# Patient Record
Sex: FEMALE | Race: WHITE | NOT HISPANIC OR LATINO | URBAN - METROPOLITAN AREA
[De-identification: names, ages, dates, MRNs, and addresses within clinical notes are randomized per-mention and may not be internally consistent; named-entity substitution may affect disease eponyms.]

---

## 2018-06-26 ENCOUNTER — HISTORICAL (OUTPATIENT)
Dept: ADMINISTRATIVE | Facility: HOSPITAL | Age: 63
End: 2018-06-26

## 2020-06-16 ENCOUNTER — HISTORICAL (OUTPATIENT)
Dept: ADMINISTRATIVE | Facility: HOSPITAL | Age: 65
End: 2020-06-16

## 2020-08-19 ENCOUNTER — HISTORICAL (OUTPATIENT)
Dept: RADIOLOGY | Facility: HOSPITAL | Age: 65
End: 2020-08-19

## 2020-09-03 LAB
ABS NEUT (OLG): 5.26 X10(3)/MCL (ref 2.1–9.2)
ALBUMIN SERPL-MCNC: 4.2 GM/DL (ref 3.4–4.8)
ALBUMIN/GLOB SERPL: 1.4 RATIO (ref 1.1–2)
ALP SERPL-CCNC: 65 UNIT/L (ref 40–150)
ALT SERPL-CCNC: 11 UNIT/L (ref 0–55)
APPEARANCE, UA: CLEAR
AST SERPL-CCNC: 14 UNIT/L (ref 5–34)
BACTERIA SPEC CULT: ABNORMAL /HPF
BILIRUB SERPL-MCNC: 1.3 MG/DL (ref 0.2–1.2)
BILIRUB UR QL STRIP: NEGATIVE
BILIRUBIN DIRECT+TOT PNL SERPL-MCNC: 0.4 MG/DL (ref 0–0.5)
BILIRUBIN DIRECT+TOT PNL SERPL-MCNC: 0.9 MG/DL (ref 0–0.8)
BUN SERPL-MCNC: 19.8 MG/DL (ref 9.8–20.1)
CALCIUM SERPL-MCNC: 9.2 MG/DL (ref 8.4–10.2)
CHLORIDE SERPL-SCNC: 103 MMOL/L (ref 98–107)
CO2 SERPL-SCNC: 26 MMOL/L (ref 23–31)
COLOR UR: YELLOW
CREAT SERPL-MCNC: 1 MG/DL (ref 0.57–1.11)
ERYTHROCYTE [DISTWIDTH] IN BLOOD BY AUTOMATED COUNT: 13.3 % (ref 11.5–17)
GLOBULIN SER-MCNC: 2.9 GM/DL (ref 2.4–3.5)
GLUCOSE (UA): NEGATIVE
GLUCOSE SERPL-MCNC: 82 MG/DL (ref 82–115)
HCT VFR BLD AUTO: 44.8 % (ref 37–47)
HGB BLD-MCNC: 14.9 GM/DL (ref 12–16)
HGB UR QL STRIP: ABNORMAL
KETONES UR QL STRIP: NEGATIVE
LEUKOCYTE ESTERASE UR QL STRIP: NEGATIVE
MCH RBC QN AUTO: 30.4 PG (ref 27–31)
MCHC RBC AUTO-ENTMCNC: 33.3 GM/DL (ref 33–36)
MCV RBC AUTO: 91.4 FL (ref 80–94)
MRSA SCREEN BY PCR: NEGATIVE
NITRITE UR QL STRIP: NEGATIVE
NRBC BLD AUTO-RTO: 0 % (ref 0–0.2)
PH UR STRIP: 5 [PH] (ref 5–7)
PLATELET # BLD AUTO: 241 X10(3)/MCL (ref 130–400)
PMV BLD AUTO: 9 FL (ref 7.4–10.4)
POTASSIUM SERPL-SCNC: 3.8 MMOL/L (ref 3.5–5.1)
PROT SERPL-MCNC: 7.1 GM/DL (ref 5.8–7.6)
PROT UR QL STRIP: NEGATIVE
RBC # BLD AUTO: 4.9 X10(6)/MCL (ref 4.2–5.4)
RBC #/AREA URNS HPF: ABNORMAL /HPF
SODIUM SERPL-SCNC: 142 MMOL/L (ref 136–145)
SP GR UR STRIP: 1.01 (ref 1–1.03)
SQUAMOUS EPITHELIAL, UA: ABNORMAL /LPF
UROBILINOGEN UR STRIP-ACNC: NEGATIVE
WBC # SPEC AUTO: 8.8 X10(3)/MCL (ref 4.5–11.5)
WBC #/AREA URNS HPF: ABNORMAL /HPF

## 2020-09-16 ENCOUNTER — HOSPITAL ENCOUNTER (OUTPATIENT)
Dept: ORTHOPEDICS | Facility: HOSPITAL | Age: 65
End: 2020-09-18
Attending: ORTHOPAEDIC SURGERY | Admitting: ORTHOPAEDIC SURGERY

## 2020-09-16 LAB
HCT VFR BLD AUTO: 41.6 % (ref 37–47)
HGB BLD-MCNC: 13.4 GM/DL (ref 12–16)

## 2020-09-17 LAB
ABS NEUT (OLG): 13.38 X10(3)/MCL (ref 2.1–9.2)
BUN SERPL-MCNC: 20.7 MG/DL (ref 9.8–20.1)
CALCIUM SERPL-MCNC: 7.8 MG/DL (ref 8.4–10.2)
CHLORIDE SERPL-SCNC: 108 MMOL/L (ref 98–107)
CO2 SERPL-SCNC: 22 MMOL/L (ref 23–31)
CREAT SERPL-MCNC: 0.83 MG/DL (ref 0.57–1.11)
CREAT/UREA NIT SERPL: 25
ERYTHROCYTE [DISTWIDTH] IN BLOOD BY AUTOMATED COUNT: 13.5 % (ref 11.5–17)
GLUCOSE SERPL-MCNC: 126 MG/DL (ref 82–115)
HCT VFR BLD AUTO: 33.1 % (ref 37–47)
HGB BLD-MCNC: 10.7 GM/DL (ref 12–16)
MCH RBC QN AUTO: 30.4 PG (ref 27–31)
MCHC RBC AUTO-ENTMCNC: 32.3 GM/DL (ref 33–36)
MCV RBC AUTO: 94 FL (ref 80–94)
NRBC BLD AUTO-RTO: 0 % (ref 0–0.2)
PLATELET # BLD AUTO: 210 X10(3)/MCL (ref 130–400)
PMV BLD AUTO: 9.4 FL (ref 7.4–10.4)
POTASSIUM SERPL-SCNC: 4.3 MMOL/L (ref 3.5–5.1)
RBC # BLD AUTO: 3.52 X10(6)/MCL (ref 4.2–5.4)
SODIUM SERPL-SCNC: 140 MMOL/L (ref 136–145)
WBC # SPEC AUTO: 15.6 X10(3)/MCL (ref 4.5–11.5)

## 2020-09-18 LAB
ABS NEUT (OLG): 4.43 X10(3)/MCL (ref 2.1–9.2)
BUN SERPL-MCNC: 18.5 MG/DL (ref 9.8–20.1)
CALCIUM SERPL-MCNC: 8.1 MG/DL (ref 8.4–10.2)
CHLORIDE SERPL-SCNC: 108 MMOL/L (ref 98–107)
CO2 SERPL-SCNC: 25 MMOL/L (ref 23–31)
CREAT SERPL-MCNC: 0.76 MG/DL (ref 0.57–1.11)
CREAT/UREA NIT SERPL: 24
ERYTHROCYTE [DISTWIDTH] IN BLOOD BY AUTOMATED COUNT: 13.9 % (ref 11.5–17)
GLUCOSE SERPL-MCNC: 97 MG/DL (ref 82–115)
HCT VFR BLD AUTO: 29.4 % (ref 37–47)
HGB BLD-MCNC: 9.5 GM/DL (ref 12–16)
MCH RBC QN AUTO: 30.3 PG (ref 27–31)
MCHC RBC AUTO-ENTMCNC: 32.3 GM/DL (ref 33–36)
MCV RBC AUTO: 93.6 FL (ref 80–94)
NRBC BLD AUTO-RTO: 0 % (ref 0–0.2)
PLATELET # BLD AUTO: 184 X10(3)/MCL (ref 130–400)
PMV BLD AUTO: 9.9 FL (ref 7.4–10.4)
POTASSIUM SERPL-SCNC: 4.2 MMOL/L (ref 3.5–5.1)
RBC # BLD AUTO: 3.14 X10(6)/MCL (ref 4.2–5.4)
SODIUM SERPL-SCNC: 139 MMOL/L (ref 136–145)
WBC # SPEC AUTO: 7.6 X10(3)/MCL (ref 4.5–11.5)

## 2021-03-16 ENCOUNTER — HISTORICAL (OUTPATIENT)
Dept: ADMINISTRATIVE | Facility: HOSPITAL | Age: 66
End: 2021-03-16

## 2022-04-10 ENCOUNTER — HISTORICAL (OUTPATIENT)
Dept: ADMINISTRATIVE | Facility: HOSPITAL | Age: 67
End: 2022-04-10

## 2022-04-29 VITALS
WEIGHT: 134.69 LBS | DIASTOLIC BLOOD PRESSURE: 89 MMHG | HEIGHT: 62 IN | BODY MASS INDEX: 24.78 KG/M2 | SYSTOLIC BLOOD PRESSURE: 149 MMHG

## 2022-05-02 NOTE — HISTORICAL [facility] CERNER
This is a historical note converted from [employer]. Formatting and pictures may have been removed.  Please reference [employer] for original formatting and attached multimedia. Admit and Discharge Dates  Admit Date: 09/16/2020  Discharge Date: 09/18/2020  Physicians  Attending Physician - Rocky GRAJEDA, Pavan HOPE  Admitting Physician - Rocky GRAJEDA, Pavan HOPE  Primary Care Physician - Natalya Preciado NP  Discharge Diagnosis  1.?Status post right knee replacement?Z96.651  Surgical Procedures  09/16/2020 - [id number] - Total Knee Arthroplasty  Immunizations  No immunizations recorded for this visit.  Hospital Course  This patient was admitted for surgery. Patient tolerated the surgery well with no immediate postoperative complications. Patient progressed well with physical therapy. Patients pain was well controlled. Patient tolerated a diet. Patient is now stable and ready for discharge. ?This patient was given discharge instructions and postoperative protocols.  Objective  Physical Exam  Incisions clean, dry, and intact. No signs of infection. Neurovascular intact distally. No calf tenderness.  Patient Discharge Condition  Stable  Discharge Disposition  Home with home health   Discharge Medication Reconciliation  Prescribed  acetaminophen-HYDROcodone (Norco 5 mg-325 mg oral tablet)?1 tab(s), Oral, q4hr, PRN pain  aspirin (aspirin 81 mg oral Delayed Release (EC) tablet)?81 mg, Oral, BID  methocarbamol (Robaxin 750 mg oral tablet)?750 mg, Oral, q8hr, PRN spasm  polyethylene glycol 3350 (polyethylene glycol 3350 oral powder for reconstitution)?17 gm, Oral, Daily  Continue  FLUoxetine (FLUOXETINE HCL 40 MG CAPSULE)?40 mg, Oral, Daily  gabapentin (gabapentin 300 mg oral capsule)?300 mg, Oral, At Bedtime  hydrochlorothiazide-lisinopril (LISINOPRIL-HCTZ 20-12.5 MG TAB)?1 tab(s), Oral, Daily  levocetirizine (levocetirizine 5 mg oral tablet)?5 mg, Oral, qPM  montelukast (MONTELUKAST SOD 10 MG TABLET)?10 mg, Oral, Daily  Discontinue  diclofenac  (DICLOFENAC POT 50 MG TABLET)?50 mg, Oral, TID  diclofenac (diclofenac sodium 50 mg oral delayed release tablet)?50 mg, Oral, TID  fluticasone nasal (FLUTICASONE PROP 50 MCG SPRAY)  hydrOXYzine (HYDROXYZINE LUIS 25 MG CAP)  Education and Orders Provided  Smoking Hazards (TLBOOTHE)  Hypertension, Easy-to-Read  ==ALL== Fall Prevention in the Home, Easy-to-Read (TLBOOTHE)  ==ALL== ORTHO --- Medications -(ULTRAM) (NEXT DOSE DUE) 9/1/20 (TLBOOTHE)  ==ALL== Total/Partial Knee Replacement on Aspirin 81mg -Percocet 5/Norco 5mg -- 8/1/20 (TLBOOTHE)  Discharge Wound Care - 09/18/20 7:47:00 CDT, 3-4x/Wk for 14 day(s), Stop date 10/02/20 7:46:00 CDT, Change dressing every other day or as needed for soiling. DO NOT WET WOUND. No ointments, creams, lotions or antiseptics to the incision. Do NOT touch incision.Coverlets x...?  Discharge - 09/18/20 7:47:00 CDT, Home, After PTGive all scheduled vaccinations prior to discharge. Multivitamin with iron for 1 month. Blood clot prevention:Baby Aspirin 81mg twice a day for 6 weeks. Start tonight.Wear MARIANN hose for 2-6 weeks or until the swelling decreases - may take off at night.Increase walking distance each day.Constipation prevention: Stool softeners while on pain meds.Use over the counter LAXATIVES as needed for constipation.Drink plenty of water.Increase Fiber in diet.Pneumonia Prevention:Walk around every 2 hours and continue breathing exercises every 2 hours. Stay out of bed as much as possible.?  Discharge Activity - Activity as Tolerated, Full Weight Bearing with walker. NO heavy lifting, pulling, pushing, straining. Take it easy! Exercises per PT and outpatient physical therapy 3 times a week for 4 weeks. Elevate affected extremity ABOVE THE LEVEL OF THE HEART. ANNA Ice machine as needed for swelling.?  Discharge Diet - Regular, As prior to surgery?  Follow up  Pavan Hidalgo on 10/01/2020  ????  Appt @ 1:30pm  [facility]  ????  THIS IS YOUR HOME HEALTH AGENCY. CALL IF YOU  HAVE QUESTIONS OR CONCERNS. CONTACT  # [phone].  Natalya Preciado, on 10/05/2020  ????  PCP follow up  [facility]  ????  THIS IS THE EQUIPMENT COMPANY. CALL IF YOU HAVE QUESTIONS OR CONCERNS. [phone]  [employer]  No Qualifying Data

## 2022-05-02 NOTE — HISTORICAL [facility]
This is a historical note converted from [employer]. Formatting and pictures may have been removed.  Please reference [facility] for original formatting and attached multimedia. OPERATIVE REPORT  ?  DATE: 9/16/2020  ?  ASSISTANT: None  ?  PREOPERATIVE DIAGNOSIS:  1. ?Right knee osteoarthritis  ?  POSTOPERATIVE DIAGNOSIS:  Same  ?  PROCEDURES:  1. ?Right custom total knee arthroplasty (press-fit)  ?  ANESTHESIA:  Spinal anesthesia  ?  BLOOD LOSS:  Less than 20 cc  ?  DVT PROPHYLAXIS:  Aspirin BID for 30 days  ?  INSTRUMENTATION:  Instrumentation: [employer] knee system, size 4 press-fit femur CR, size 3 press-fit tibial baseplate, 11 mm polyethylene dished insert, 29 mm patella, Cecil Simplex P antibiotic cement with tobramycin for patella  ?  PROCEDURE IN DETAIL:  ?  Custom Total Knee Arthroplasty (press-fit)  ?  After receiving antibiotics in the preoperative holding area, the patient was taken to the operating room. Patient was placed on the table and meticulously padded. A tourniquet was placed proximally, and the patient was prepped and draped in normal sterile fashion. A time out was performed and the correct operative extremity, antibiotics, and allergies were confirmed.?  ?  The operative leg was elevated, exsanguinated, flexed the knee, and inflated the tourniquet. A midline incision and mid-vastus approach were made, which allowed the assistant and me to develop the operative field. ?The femoral custom cutting block was placed in the femur. ?It was pinned appropriately. ?The distal femoral cuts were then made. ?They were measured and confirmed with the preoperative templating.  ?  I then placed on the 4 in1 femoral cutting block and made the cuts. ?These were measured and confirmed with the preoperative templating. ?I make sure to check and confirm rotation prior to making the cuts.  ?  The anterior cruciate ligament and posterior cruciate ligament was excised. ?I then placed on the custom tibial guide. ?I  pinned it in place. ?I then made my tibial cut. ?I measured the cuts and confirmed with the preoperative templating. ?I make sure the appropriate rotation was placed in the tibial plate and it lined up with the medial one third of the tibial tubercle. ?This was done prior to making cuts. I then trialed the preop templated implants. ?The knee moved with normal kinematics and was very stable through extension and flexion.  ?  The trial implants were then removed. ?Spreaders were placed in the knee. Any osteophytes were removed, and any remaining menisci were excised. The final implants were press-fit in to place, starting with the tibia, then the femur, then the polyethylene insert, and lastly the patella. ?Again, ?the knee moved with normal kinematics and was very stable through extension and flexion. We copiously lavaged the knee. A layered closure was facilitated using #1 absorbable suture for the capsule, 2-0 absorbable sutures were used for the subcutaneous tissues. ?3-0 nylon was used for the skin. Then a sterile dressing was applied.?  ?  Patient tolerated procedure well and went to the recovery room in stable condition.

## 2022-06-21 ENCOUNTER — OFFICE VISIT (OUTPATIENT)
Dept: ORTHOPEDICS | Facility: CLINIC | Age: 67
End: 2022-06-21
Payer: MEDICARE

## 2022-06-21 ENCOUNTER — HOSPITAL ENCOUNTER (OUTPATIENT)
Dept: RADIOLOGY | Facility: CLINIC | Age: 67
Discharge: HOME | End: 2022-06-21
Attending: ORTHOPAEDIC SURGERY
Payer: MEDICARE

## 2022-06-21 VITALS — TEMPERATURE: 98 F | BODY MASS INDEX: 26.05 KG/M2 | WEIGHT: 147 LBS | HEIGHT: 63 IN
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
## 2022-06-21 DIAGNOSIS — Z96.651 STATUS POST TOTAL KNEE REPLACEMENT, RIGHT: ICD-10-CM

## 2022-06-21 DIAGNOSIS — Z96.651 STATUS POST TOTAL KNEE REPLACEMENT, RIGHT: Primary | ICD-10-CM

## 2022-06-21 PROCEDURE — 73562 XR KNEE COMP 4 OR MORE VIEWS RIGHT: ICD-10-PCS | Mod: RT,,, | Performed by: ORTHOPAEDIC SURGERY

## 2022-06-21 PROCEDURE — 3288F FALL RISK ASSESSMENT DOCD: CPT | Mod: CPTII,,, | Performed by: ORTHOPAEDIC SURGERY

## 2022-06-21 PROCEDURE — 1159F PR MEDICATION LIST DOCUMENTED IN MEDICAL RECORD: ICD-10-PCS | Mod: CPTII,,, | Performed by: ORTHOPAEDIC SURGERY

## 2022-06-21 PROCEDURE — 73562 X-RAY EXAM OF KNEE 3: CPT | Mod: RT,,, | Performed by: ORTHOPAEDIC SURGERY

## 2022-06-21 PROCEDURE — 1101F PT FALLS ASSESS-DOCD LE1/YR: CPT | Mod: CPTII,,, | Performed by: ORTHOPAEDIC SURGERY

## 2022-06-21 PROCEDURE — 1101F PR PT FALLS ASSESS DOC 0-1 FALLS W/OUT INJ PAST YR: ICD-10-PCS | Mod: CPTII,,, | Performed by: ORTHOPAEDIC SURGERY

## 2022-06-21 PROCEDURE — 3008F BODY MASS INDEX DOCD: CPT | Mod: CPTII,,, | Performed by: ORTHOPAEDIC SURGERY

## 2022-06-21 PROCEDURE — 3008F PR BODY MASS INDEX (BMI) DOCUMENTED: ICD-10-PCS | Mod: CPTII,,, | Performed by: ORTHOPAEDIC SURGERY

## 2022-06-21 PROCEDURE — 1159F MED LIST DOCD IN RCRD: CPT | Mod: CPTII,,, | Performed by: ORTHOPAEDIC SURGERY

## 2022-06-21 PROCEDURE — 1125F PR PAIN SEVERITY QUANTIFIED, PAIN PRESENT: ICD-10-PCS | Mod: CPTII,,, | Performed by: ORTHOPAEDIC SURGERY

## 2022-06-21 PROCEDURE — 1125F AMNT PAIN NOTED PAIN PRSNT: CPT | Mod: CPTII,,, | Performed by: ORTHOPAEDIC SURGERY

## 2022-06-21 PROCEDURE — 3288F PR FALLS RISK ASSESSMENT DOCUMENTED: ICD-10-PCS | Mod: CPTII,,, | Performed by: ORTHOPAEDIC SURGERY

## 2022-06-21 PROCEDURE — 99213 OFFICE O/P EST LOW 20 MIN: CPT | Mod: ,,, | Performed by: ORTHOPAEDIC SURGERY

## 2022-06-21 PROCEDURE — 99213 PR OFFICE/OUTPT VISIT, EST, LEVL III, 20-29 MIN: ICD-10-PCS | Mod: ,,, | Performed by: ORTHOPAEDIC SURGERY

## 2022-06-21 RX ORDER — GABAPENTIN 300 MG/1
CAPSULE ORAL
COMMUNITY

## 2022-06-21 RX ORDER — LEVOCETIRIZINE DIHYDROCHLORIDE 5 MG/1
0.5 TABLET, FILM COATED ORAL NIGHTLY
COMMUNITY

## 2022-06-21 RX ORDER — HYDROCHLOROTHIAZIDE 25 MG/1
TABLET ORAL
COMMUNITY

## 2022-06-21 RX ORDER — VIT C/E/ZN/COPPR/LUTEIN/ZEAXAN 250MG-90MG
CAPSULE ORAL
COMMUNITY

## 2022-06-21 RX ORDER — HYDROXYZINE PAMOATE 25 MG/1
CAPSULE ORAL
COMMUNITY

## 2022-06-21 RX ORDER — DICLOFENAC SODIUM 50 MG/1
TABLET, DELAYED RELEASE ORAL
COMMUNITY

## 2022-06-21 RX ORDER — GLUCOSAM/CHONDRO/HERB 149/HYAL 750-100 MG
TABLET ORAL
COMMUNITY

## 2022-06-21 RX ORDER — MONTELUKAST SODIUM 10 MG/1
1 TABLET ORAL DAILY
COMMUNITY

## 2022-06-21 RX ORDER — OMEPRAZOLE 20 MG/1
CAPSULE, DELAYED RELEASE ORAL
COMMUNITY

## 2022-06-21 RX ORDER — GUAIFENESIN AND PHENYLEPHRINE HCL 400; 10 MG/1; MG/1
TABLET ORAL
COMMUNITY

## 2022-06-21 RX ORDER — LISINOPRIL AND HYDROCHLOROTHIAZIDE 12.5; 2 MG/1; MG/1
TABLET ORAL
COMMUNITY

## 2022-06-21 RX ORDER — FLUOXETINE HYDROCHLORIDE 40 MG/1
1 CAPSULE ORAL DAILY
COMMUNITY

## 2022-06-21 RX ORDER — HYDROCHLOROTHIAZIDE 12.5 MG/1
TABLET ORAL
COMMUNITY

## 2022-06-21 NOTE — PROGRESS NOTES
History of present illness:    This is a 66 y.o. year old female that presents today with right knee pain.  She had a previous total knee replacement done approximately 2 years ago.  She states that recently she had a loss of consciousness and passed out and does not recall exactly what happened but notes that she had some pain in her knee afterwards.  She does want to make sure there was no damage to her previous knee replacement.    Past Medical History:   Diagnosis Date    Arthritis     Hypertension        Past Surgical History:   Procedure Laterality Date    CHOLECYSTECTOMY      LAPAROSCOPIC TOTAL HYSTERECTOMY      right total knee replacement Right 06/19/2020       Current Outpatient Medications   Medication Sig    cholecalciferol, vitamin D3, (VITAMIN D3) 25 mcg (1,000 unit) capsule 1 capsule    diclofenac (VOLTAREN) 50 MG EC tablet 1 tablet as needed with food for arthritis pain    FLUoxetine 40 MG capsule Take 1 capsule by mouth once daily.    gabapentin (NEURONTIN) 300 MG capsule gabapentin 300 mg capsule    hydroCHLOROthiazide (HYDRODIURIL) 12.5 MG Tab hydrochlorothiazide 12.5 mg tablet    hydroCHLOROthiazide (HYDRODIURIL) 25 MG tablet hydrochlorothiazide 25 mg tablet    hydrOXYzine pamoate (VISTARIL) 25 MG Cap 1-2 capsules    levocetirizine (XYZAL) 5 MG tablet Take 0.5 tablets by mouth every evening.    lisinopriL-hydrochlorothiazide (PRINZIDE,ZESTORETIC) 20-12.5 mg per tablet lisinopril 20 mg-hydrochlorothiazide 12.5 mg tablet    montelukast (SINGULAIR) 10 mg tablet Take 1 tablet by mouth once daily.    omega 3-dha-epa-fish oil 1,000 mg (120 mg-180 mg) Cap 1 capsule    omeprazole (PRILOSEC) 20 MG capsule TAKE 1 CAPSULE 30 MINUTES BEFORE MORNING MEAL ONCE A DAY    turmeric root extract 500 mg Cap 2 Capsules     No current facility-administered medications for this visit.       Review of patient's allergies indicates:  No Known Allergies    History reviewed. No pertinent family  "history.    Social History     Socioeconomic History    Marital status:    Tobacco Use    Smoking status: Former Smoker    Smokeless tobacco: Never Used       Chief Complaint:   Chief Complaint   Patient presents with    Follow-up     2 years s/p Right TKA 6-19-20..woke up Sunday on the floor and has had pain since..not sure if she fell or passed out and just sat down..xray today       Consulting Physician: No ref. provider found      Review of Systems:    All review of systems negative except for those stated in the HPI    Examination:    Vital Signs:    Vitals:    06/21/22 1442   Temp: 98.2 °F (36.8 °C)   Weight: 66.7 kg (147 lb)   Height: 5' 3" (1.6 m)   PainSc:   2   PainLoc: Knee       Body mass index is 26.04 kg/m².    Physical Exam:   General: Well-developed, well-nourished.  Neuro: Alert and oriented x 3.  Psych: Normal mood and affect.  Knee Exam:    No obvious deformity. Range of motion from 0-120 degrees. Negative patella grind and equal subluxation of knee cap medial and lateral < 1cm. Negative patella tendon tenderness. Negative Lachman and anterior drawer test. Negative posterior drawer test. Negative varus and valgus stress test. Negative medial joint line tenderness. Negative lateral joint line tenderness. 5/5 strength and normal skin appearance. Sensibility normal    Imaging: X-rays ordered and images interpreted today personally by me of three views of the right knee shows appropriate implant placement no signs of any implant loosening.         Assessment: Status post total knee replacement, right  -     X-Ray Knee Complete 4 Or More Views Right; Future; Expected date: 06/21/2022    Fall, initial encounter        Plan:    I suspect that this patient should start feeling better within the next 2-3 weeks.  She will come back to see me as needed.  I recommend over-the-counter anti-inflammatories as needed.            DISCLAIMER: This note may have been dictated using voice recognition " software and may contain grammatical errors.     NOTE: Consult report sent to referring provider via [facility] [employer].